# Patient Record
Sex: MALE | Race: BLACK OR AFRICAN AMERICAN | NOT HISPANIC OR LATINO | Employment: FULL TIME | URBAN - METROPOLITAN AREA
[De-identification: names, ages, dates, MRNs, and addresses within clinical notes are randomized per-mention and may not be internally consistent; named-entity substitution may affect disease eponyms.]

---
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
## 2025-03-06 DIAGNOSIS — M41.9 SCOLIOSIS: Primary | ICD-10-CM

## 2025-03-12 DIAGNOSIS — M41.9 MILD SCOLIOSIS: Primary | ICD-10-CM

## 2025-03-12 DIAGNOSIS — M54.9 BACK PAIN: ICD-10-CM

## 2025-03-19 ENCOUNTER — CLINICAL SUPPORT (OUTPATIENT)
Dept: REHABILITATION | Facility: HOSPITAL | Age: 20
End: 2025-03-19
Payer: MEDICAID

## 2025-03-19 DIAGNOSIS — M25.69 DECREASED ROM OF TRUNK AND BACK: ICD-10-CM

## 2025-03-19 DIAGNOSIS — R29.898 DECREASED STRENGTH OF TRUNK AND BACK: ICD-10-CM

## 2025-03-19 DIAGNOSIS — R29.898 IMPAIRED FLEXIBILITY OF LOWER EXTREMITY: ICD-10-CM

## 2025-03-19 DIAGNOSIS — R29.898 IMPAIRED STRENGTH OF HIP MUSCLES: ICD-10-CM

## 2025-03-19 DIAGNOSIS — M54.6 CHRONIC MIDLINE THORACIC BACK PAIN: ICD-10-CM

## 2025-03-19 DIAGNOSIS — G89.29 CHRONIC MIDLINE THORACIC BACK PAIN: ICD-10-CM

## 2025-03-19 DIAGNOSIS — M41.9 MILD SCOLIOSIS: Primary | ICD-10-CM

## 2025-03-19 PROCEDURE — 97162 PT EVAL MOD COMPLEX 30 MIN: CPT

## 2025-03-19 NOTE — LETTER
March 20, 2025  Yaneth Flores NP  [address] [place] [id number]    To whom it may concern,     The attached plan of care is being sent to you for review and reference.    You may indicate your approval by signing the document electronically, or by faxing/mailing a signed copy of the final page of this document back to the attention of Nirav Bryant, PT:         Plan of Care 3/20/25   Effective from: 3/20/2025  Effective to: 5/29/2025    Plan ID: [id number]            Participants as of Finalize on 3/20/2025    Name Type Comments Contact Info    Yaneth Flores NP PCP - General  [phone]    Nirav Bryant, PT Physical Therapist         Last Plan Note     Author: Nirav Bryant, PT Status: Signed Last edited: 3/19/2025 12:45 PM         Outpatient Rehab    Physical Therapy Evaluation (only)    Patient Name: Espinoza Arango  MRN: [id number]  YOB: 2005  Encounter Date: 3/19/2025    Therapy Diagnosis:   Encounter Diagnoses   Name Primary?    Mild scoliosis Yes    Chronic midline thoracic back pain     Decreased ROM of trunk and back     Impaired strength of hip muscles     Decreased strength of trunk and back     Impaired flexibility of lower extremity      Physician: Yaneth Flores NP    Physician Orders: Eval and Treat  Medical Diagnosis: Mild scoliosis  Back pain    Visit # / Visits Authorized:  1 / 1  Date of Evaluation: 3/19/2025  Insurance Authorization Period: 3/19/2025 to 3/18/2026  Plan of Care Certification:  3/19/2025 to TO BE DETERMINED      PT/PTA: PT   Number of PTA visits since last PT visit:0  Time In: 1248   Time Out: 1339  Total Time: 51   Total Billable Time: 51    Intake Outcome Measure for FOTO Survey    Therapist reviewed FOTO scores for Espinoza Arango on 3/19/2025.   FOTO report - see Media section or FOTO account episode details.     Intake Score: 51%         Subjective   History of Present Illness  Espinoza is a 19 y.o. male who reports to  physical therapy with a chief concern of Midline Thoracic Back pain.     The patient reports a medical diagnosis of M41.9 Mild Scoliosis / M54.6 , G89.29 Chronic midline Thoracic pain. The patient has experienced this issue since 03/11/25.   Diagnostic tests related to this condition: X-ray.   X-Ray Details: 01/22/2021 thoracic curve of 32% and lumbar curve fo 10%    Dominant Hand: Right  History of Present Condition/Illness: Espinoza reports that he has been having back pain over the past 5 years. Patient was diagnosed with Scoliosis in 2021 and provided a list of exercises to perform. Patient states that pain progressively worsened over time. Approximately one month ago the patient was working around his home and began to experience a significant increase in back pain requiring bed rest. Espinoza states that the pain remains constant even at rest. Patient requires support of pillows when sitting in a chair or lying in bed.     Activities of Daily Living  Social history was obtained from Patient.    General Prior Level of Function Comments: Independent  General Current Level of Function Comments: Independent  Patient Responsibilities: Community mobility, Driving, Financial management, Health management, Home management, Laundry, Meal prep, Shopping, Personal ADL, Yard work    Previously independent with activities of daily living? Yes     Currently independent with activities of daily living? Yes     Modified independent due to increased time to complete task     Previously independent with instrumental activities of daily living? Yes     Currently independent with instrumental activities of daily living? Yes     Modified independent due to increased time to complete task         Pain     Patient reports a current pain level of 8/10. Pain at best is reported as 6/10. Pain at worst is reported as 6/10.   Location: Midline lower thoracic spine  Clinical Progression (since onset): Stable  Pain Qualities: Aching,  Burning  Pain-Relieving Factors: Medications - prescription  Pain-Aggravating Factors: Bending, Lifting, Rotation, Standing, Twisting, Walking         Treatment History  Treatments  Previously Received Treatments: No  Currently Receiving Treatments: No    Living Arrangements  Living Situation  Housing: Home independently  Living Arrangements: Spouse/significant other  Support Systems: Spouse/significant other    Home Setup  Type of Structure: Mobile home  Home Access: Stairs with rails  Entrance Stairs - Number of Steps: 5  Entrance Stairs - Rails: Right  Number of Levels in Home: One level        Employment  Patient does not report that: Does the patient's condition impact their ability to work?  Employment Status: Employed full-time   Operates heavy equipment. Patient reports that it has mild effect on his work.       Past Medical History/Physical Systems Review:   Damon Arango  has no past medical history on file.    Damon Arango  has no past surgical history on file.    Damon currently has no medications in their medication list.    Review of patient's allergies indicates:  Not on File     Objective   Posture    Scoliosis is observed. 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 Shoulders are Elevated. Right scapula is: Elevated and Protracted  Left pelvis characteristics: Ilium Higher              Lower Extremity Sensation  Right Lumbar/Lower Extremity Sensation  Intact: Light Touch       Left Lumbar/Lower Extremity Sensation  Intact: Light Touch                Spinal Mobility  Hypomobile: Thoracic and Lumbosacral  Thoracic Mobility Details: Moderate decreased PA glide   Lumbosacral Mobility Details: Moderate decreased PA glide     Spinal Muscle Palpation          Left Spinal Muscle Palpation  Abnormal: Cervical/Thoracic  Left Cervical/Thoracic Muscle Palpation Observations: decreased paraspinal muscle bulk           Lumbar Range of Motion   Active (deg) Passive (deg)  Pain   Flexion 50       Extension 100       Right Lateral Flexion 80       Right Rotation 85   Yes   Left Lateral Flexion 10       Left Rotation 70                Hip Range of Motion    Tight internal and external rotation in Left hip        Lumbar Strength   Strength Pain   Flexion 4+     Extension 4+     Right Lateral Flexion       Left Lateral Flexion       Right Rotation       Left Rotation                    Hip Strength - Planes of Motion   Right Strength Right Pain Left Strength Left  Pain   Flexion (L2) 5   4     Extension 2+   2+     ABduction 5   5     ADduction 5   5     Internal Rotation 5   5     External Rotation 5   5         Knee Strength   Right Strength Right Pain Left Strength Left  Pain   Flexion (S2) 5   5     Prone Flexion           Extension (L3) 5   5            Ankle/Foot Strength - Planes of Motion   Right Strength Right Pain Left Strength Left  Pain   Dorsiflexion (L4) 5   5     Plantar Flexion (S1) 5   5     Inversion 5   5     Eversion 5   5     Great Toe Flexion 5   5     Great Toe Extension (L5) 5   5     Lesser Toes Flexion           Lesser Toes Extension                  Lumbar/Pelvic Girdle Special Tests  Lumbar Tests - Repeated  Positive: Flexion  Negative: Extension  pain increased with reps    Lumbar Tests - SLR and Tension  Positive: Left Passive Straight Leg Raise                 Pelvic Girdle / Sacrum Tests  Negative: Right Sacral Spring, Left Sacral Spring, Right Sacroiliac Compression, and Left Sacroiliac Compression  Negative: Right Thigh Thrust/Posterior Shear and Left Thigh Thrust/Posterior Shear         Hip Special Tests  Sacroiliac Joint Tests  Negative: Right Compression, Left Compression, Right Thigh Thrust/Posterior Shear, and Left Thigh Thrust/Posterior Shear              Fall Risk  Functional mobility test results suggest the patient is not: At Risk for Falls  Four Stage Balance Test  Narrow Base of Support: 10 sec sec  Tandem Stand - Right Foot in Front: 10  sec  Tandem Stand - Left Foot in Front: 10 sec        Single Leg Stand - Right Foot: 10 sec  Single Leg Stand - Left Foot: 4 sec       Squat Testing  The patient completed 3 squat repetitions.  Observations  Bilateral: Knee Varus             Gait Analysis  Base of Support: Normal    Right Side Walking Observations  Decreased: Arm Swing          Knee Observations During Gait  Bilateral: Knee Varus Thrust        Time Entry(in minutes):  PT Evaluation (Moderate) Time Entry: 51    Assessment & Plan   Assessment  Espinoza presents with a condition of Moderate complexity.   Presentation of Symptoms: Stable  Will Comorbidities Impact Care: No       Functional Limitations: Activity tolerance, Carrying objects, Completing self-care activities, Completing work/school activities, Decreased ambulation distance/endurance, Pain with ADLs/IADLs, Participating in leisure activities, Participating in sports, Performing household chores, Range of motion, Standing tolerance, Sitting tolerance  Impairments: Abnormal muscle tone, Abnormal or restricted range of motion, Activity intolerance, Impaired balance, Impaired physical strength, Pain with functional activity  Personal Factors Affecting Prognosis: Pain    Patient Goal for Therapy (PT): I want to be able to complete my daily activities with less pain. I would like to begin working out.  Prognosis: Excellent  Assessment Details: Espinoza completed all evaluation tests and  measures with good effort . Patient is motivated to work with therapy and improve outcome.     Plan  From a physical therapy perspective, the patient would benefit from: Skilled Rehab Services    Planned therapy interventions include: Therapeutic exercise, Therapeutic activities, Neuromuscular re-education, and Manual therapy.            Visit Frequency: 2 times Per Week for 8 Weeks.       This plan was discussed with Patient.   Discussion participants: Agreed Upon Plan of Care             Patient's spiritual, cultural, and  educational needs considered and patient agreeable to plan of care and goals.     Education  Education was done with Patient. The patient's learning style includes Demonstration and Listening. The patient Demonstrates understanding and Verbalizes understanding.         Instructed in HEP consisting of seated stretching of Bilateral hamstrings and piriformis along with supine stretching of bilateral rectus.        Goals:   Active       Flexibility        Patient will improve bilateral lower extremity flexibility to 80% of normal range to decrease burden and improve mobility in spine. . (Progressing)       Start:  03/19/25    Expected End:  05/28/25               Functional outcome       Patient will show a significant change > 10 points in FOTO patient-reported outcome tool to demonstrate subjective improvement (Progressing)       Start:  03/19/25    Expected End:  05/28/25       Intake : 51         Patient stated goal:   I want to be able to workout without having a lot of pain  (Progressing)       Start:  03/19/25    Expected End:  05/28/25            Patient will demonstrate independence in home program for support of progression (Progressing)       Start:  03/19/25    Expected End:  04/30/25               Maintaining body position       Patient will maintain Left Single leg stance position >/= 15 seconds to demonstrate improved coordination and strength  (Progressing)       Start:  03/19/25    Expected End:  04/30/25               Pain       Patient will report pain of </= 3/10 demonstrating a reduction of overall pain following workday  (Progressing)       Start:  03/19/25    Expected End:  05/28/25               Range of Motion       Patient will achieve spinal flexion to 70% of normal motion  (Progressing)       Start:  03/19/25    Expected End:  04/30/25            Patient will achieve left spinal rotation ROM 85 degrees (Progressing)       Start:  03/19/25    Expected End:  04/30/25               Strength        Patient will demonstrate good abdominal strength by maintaining pelvic control during dead bug (Progressing)       Start:  03/19/25    Expected End:  04/30/25            Patient will demonstrate good posterior spinal strength by maintaining stability during dead bug  (Progressing)       Start:  03/19/25    Expected End:  05/28/25            Patient will achieve bilateral hip flexion strength of 5/5 (Progressing)       Start:  03/19/25    Expected End:  05/28/25            Patient will achieve bilateral hip extension strength of 5/5 (Progressing)       Start:  03/19/25    Expected End:  05/28/25            Patient will achieve bilateral hip abduction strength of 5/5 (Progressing)       Start:  03/19/25    Expected End:  05/28/25                Nirav Bryant PT           Current Participants as of 3/20/2025    Name Type Comments Contact Info    Yaneth Flores NP PCP - General  [phone]    Signature pending    Nirav Bryant PT Physical Therapist                  Sincerely,      Nirav Bryant PT  [facility]                                                            Dear Nirav Bryant PT,    RE: Mr. Damon Arango, MRN: [id number]    I certify that I have reviewed the attached plan of care and agree to the details within.        ___________________________  ___________________________  Provider Printed Name   Provider Signed Name      ___________________________  Date and Time

## 2025-03-20 NOTE — PROGRESS NOTES
Outpatient Rehab    Physical Therapy Evaluation (only)    Patient Name: Espinoza Arango  MRN: [id number]  YOB: 2005  Encounter Date: 3/19/2025    Therapy Diagnosis:   Encounter Diagnoses   Name Primary?    Mild scoliosis Yes    Chronic midline thoracic back pain     Decreased ROM of trunk and back     Impaired strength of hip muscles     Decreased strength of trunk and back     Impaired flexibility of lower extremity      Physician: Yaneth Flores, NP    Physician Orders: Eval and Treat  Medical Diagnosis: Mild scoliosis  Back pain    Visit # / Visits Authorized:  1 / 1  Date of Evaluation: 3/19/2025  Insurance Authorization Period: 3/19/2025 to 3/18/2026  Plan of Care Certification:  3/19/2025 to TO BE DETERMINED      PT/PTA: PT   Number of PTA visits since last PT visit:0  Time In: 1248   Time Out: 1339  Total Time: 51   Total Billable Time: 51    Intake Outcome Measure for FOTO Survey    Therapist reviewed FOTO scores for Espinoza Arango on 3/19/2025.   FOTO report - see Media section or FOTO account episode details.     Intake Score: 51%         Subjective   History of Present Illness  Espinoza is a 19 y.o. male who reports to physical therapy with a chief concern of Midline Thoracic Back pain.     The patient reports a medical diagnosis of M41.9 Mild Scoliosis / M54.6 , G89.29 Chronic midline Thoracic pain. The patient has experienced this issue since 03/11/25.   Diagnostic tests related to this condition: X-ray.   X-Ray Details: 01/22/2021 thoracic curve of 32% and lumbar curve fo 10%    Dominant Hand: Right  History of Present Condition/Illness: Espinoza reports that he has been having back pain over the past 5 years. Patient was diagnosed with Scoliosis in 2021 and provided a list of exercises to perform. Patient states that pain progressively worsened over time. Approximately one month ago the patient was working around his home and began to experience a significant increase in back pain requiring bed rest. Espinoza  states that the pain remains constant even at rest. Patient requires support of pillows when sitting in a chair or lying in bed.     Activities of Daily Living  Social history was obtained from Patient.    General Prior Level of Function Comments: Independent  General Current Level of Function Comments: Independent  Patient Responsibilities: Community mobility, Driving, Financial management, Health management, Home management, Laundry, Meal prep, Shopping, Personal ADL, Yard work    Previously independent with activities of daily living? Yes     Currently independent with activities of daily living? Yes     Modified independent due to increased time to complete task     Previously independent with instrumental activities of daily living? Yes     Currently independent with instrumental activities of daily living? Yes     Modified independent due to increased time to complete task         Pain     Patient reports a current pain level of 8/10. Pain at best is reported as 6/10. Pain at worst is reported as 6/10.   Location: Midline lower thoracic spine  Clinical Progression (since onset): Stable  Pain Qualities: Aching, Burning  Pain-Relieving Factors: Medications - prescription  Pain-Aggravating Factors: Bending, Lifting, Rotation, Standing, Twisting, Walking         Treatment History  Treatments  Previously Received Treatments: No  Currently Receiving Treatments: No    Living Arrangements  Living Situation  Housing: Home independently  Living Arrangements: Spouse/significant other  Support Systems: Spouse/significant other    Home Setup  Type of Structure: Mobile home  Home Access: Stairs with rails  Entrance Stairs - Number of Steps: 5  Entrance Stairs - Rails: Right  Number of Levels in Home: One level        Employment  Patient does not report that: Does the patient's condition impact their ability to work?  Employment Status: Employed full-time   Operates heavy equipment. Patient reports that it has mild effect on  his work.       Past Medical History/Physical Systems Review:   Damon Arango  has no past medical history on file.    Damon Arango  has no past surgical history on file.    Damon currently has no medications in their medication list.    Review of patient's allergies indicates:  Not on File     Objective   Posture    Scoliosis is observed. 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 Shoulders are Elevated. Right scapula is: Elevated and Protracted  Left pelvis characteristics: Ilium Higher              Lower Extremity Sensation  Right Lumbar/Lower Extremity Sensation  Intact: Light Touch       Left Lumbar/Lower Extremity Sensation  Intact: Light Touch                Spinal Mobility  Hypomobile: Thoracic and Lumbosacral  Thoracic Mobility Details: Moderate decreased PA glide   Lumbosacral Mobility Details: Moderate decreased PA glide     Spinal Muscle Palpation          Left Spinal Muscle Palpation  Abnormal: Cervical/Thoracic  Left Cervical/Thoracic Muscle Palpation Observations: decreased paraspinal muscle bulk           Lumbar Range of Motion   Active (deg) Passive (deg) Pain   Flexion 50       Extension 100       Right Lateral Flexion 80       Right Rotation 85   Yes   Left Lateral Flexion 10       Left Rotation 70                Hip Range of Motion    Tight internal and external rotation in Left hip        Lumbar Strength   Strength Pain   Flexion 4+     Extension 4+     Right Lateral Flexion       Left Lateral Flexion       Right Rotation       Left Rotation                    Hip Strength - Planes of Motion   Right Strength Right Pain Left Strength Left  Pain   Flexion (L2) 5   4     Extension 2+   2+     ABduction 5   5     ADduction 5   5     Internal Rotation 5   5     External Rotation 5   5         Knee Strength   Right Strength Right Pain Left Strength Left  Pain   Flexion (S2) 5   5     Prone Flexion           Extension (L3) 5   5            Ankle/Foot Strength  - Planes of Motion   Right Strength Right Pain Left Strength Left  Pain   Dorsiflexion (L4) 5   5     Plantar Flexion (S1) 5   5     Inversion 5   5     Eversion 5   5     Great Toe Flexion 5   5     Great Toe Extension (L5) 5   5     Lesser Toes Flexion           Lesser Toes Extension                  Lumbar/Pelvic Girdle Special Tests  Lumbar Tests - Repeated  Positive: Flexion  Negative: Extension  pain increased with reps    Lumbar Tests - SLR and Tension  Positive: Left Passive Straight Leg Raise                 Pelvic Girdle / Sacrum Tests  Negative: Right Sacral Spring, Left Sacral Spring, Right Sacroiliac Compression, and Left Sacroiliac Compression  Negative: Right Thigh Thrust/Posterior Shear and Left Thigh Thrust/Posterior Shear         Hip Special Tests  Sacroiliac Joint Tests  Negative: Right Compression, Left Compression, Right Thigh Thrust/Posterior Shear, and Left Thigh Thrust/Posterior Shear              Fall Risk  Functional mobility test results suggest the patient is not: At Risk for Falls  Four Stage Balance Test  Narrow Base of Support: 10 sec sec  Tandem Stand - Right Foot in Front: 10 sec  Tandem Stand - Left Foot in Front: 10 sec        Single Leg Stand - Right Foot: 10 sec  Single Leg Stand - Left Foot: 4 sec       Squat Testing  The patient completed 3 squat repetitions.  Observations  Bilateral: Knee Varus             Gait Analysis  Base of Support: Normal    Right Side Walking Observations  Decreased: Arm Swing          Knee Observations During Gait  Bilateral: Knee Varus Thrust        Time Entry(in minutes):  PT Evaluation (Moderate) Time Entry: 51    Assessment & Plan   Assessment  Espinoza presents with a condition of Moderate complexity.   Presentation of Symptoms: Stable  Will Comorbidities Impact Care: No       Functional Limitations: Activity tolerance, Carrying objects, Completing self-care activities, Completing work/school activities, Decreased ambulation distance/endurance, Pain  with ADLs/IADLs, Participating in leisure activities, Participating in sports, Performing household chores, Range of motion, Standing tolerance, Sitting tolerance  Impairments: Abnormal muscle tone, Abnormal or restricted range of motion, Activity intolerance, Impaired balance, Impaired physical strength, Pain with functional activity  Personal Factors Affecting Prognosis: Pain    Patient Goal for Therapy (PT): I want to be able to complete my daily activities with less pain. I would like to begin working out.  Prognosis: Excellent  Assessment Details: Espinoza completed all evaluation tests and  measures with good effort . Patient is motivated to work with therapy and improve outcome.     Plan  From a physical therapy perspective, the patient would benefit from: Skilled Rehab Services    Planned therapy interventions include: Therapeutic exercise, Therapeutic activities, Neuromuscular re-education, and Manual therapy.            Visit Frequency: 2 times Per Week for 8 Weeks.       This plan was discussed with Patient.   Discussion participants: Agreed Upon Plan of Care             Patient's spiritual, cultural, and educational needs considered and patient agreeable to plan of care and goals.     Education  Education was done with Patient. The patient's learning style includes Demonstration and Listening. The patient Demonstrates understanding and Verbalizes understanding.         Instructed in HEP consisting of seated stretching of Bilateral hamstrings and piriformis along with supine stretching of bilateral rectus.        Goals:   Active       Flexibility        Patient will improve bilateral lower extremity flexibility to 80% of normal range to decrease burden and improve mobility in spine. . (Progressing)       Start:  03/19/25    Expected End:  05/28/25               Functional outcome       Patient will show a significant change > 10 points in FOTO patient-reported outcome tool to demonstrate subjective improvement  (Progressing)       Start:  03/19/25    Expected End:  05/28/25       Intake : 51         Patient stated goal:   I want to be able to workout without having a lot of pain  (Progressing)       Start:  03/19/25    Expected End:  05/28/25            Patient will demonstrate independence in home program for support of progression (Progressing)       Start:  03/19/25    Expected End:  04/30/25               Maintaining body position       Patient will maintain Left Single leg stance position >/= 15 seconds to demonstrate improved coordination and strength  (Progressing)       Start:  03/19/25    Expected End:  04/30/25               Pain       Patient will report pain of </= 3/10 demonstrating a reduction of overall pain following workday  (Progressing)       Start:  03/19/25    Expected End:  05/28/25               Range of Motion       Patient will achieve spinal flexion to 70% of normal motion  (Progressing)       Start:  03/19/25    Expected End:  04/30/25            Patient will achieve left spinal rotation ROM 85 degrees (Progressing)       Start:  03/19/25    Expected End:  04/30/25               Strength       Patient will demonstrate good abdominal strength by maintaining pelvic control during dead bug (Progressing)       Start:  03/19/25    Expected End:  04/30/25            Patient will demonstrate good posterior spinal strength by maintaining stability during dead bug  (Progressing)       Start:  03/19/25    Expected End:  05/28/25            Patient will achieve bilateral hip flexion strength of 5/5 (Progressing)       Start:  03/19/25    Expected End:  05/28/25            Patient will achieve bilateral hip extension strength of 5/5 (Progressing)       Start:  03/19/25    Expected End:  05/28/25            Patient will achieve bilateral hip abduction strength of 5/5 (Progressing)       Start:  03/19/25    Expected End:  05/28/25                Nirav Bryant PT

## 2025-04-02 DIAGNOSIS — M41.9 MILD SCOLIOSIS: Primary | ICD-10-CM

## 2025-04-02 DIAGNOSIS — M54.9 BACK PAIN: ICD-10-CM
